# Patient Record
Sex: FEMALE | Race: BLACK OR AFRICAN AMERICAN | NOT HISPANIC OR LATINO | Employment: UNEMPLOYED | URBAN - METROPOLITAN AREA
[De-identification: names, ages, dates, MRNs, and addresses within clinical notes are randomized per-mention and may not be internally consistent; named-entity substitution may affect disease eponyms.]

---

## 2023-09-19 ENCOUNTER — OFFICE VISIT (OUTPATIENT)
Dept: PRIMARY CARE | Facility: CLINIC | Age: 58
End: 2023-09-19
Payer: COMMERCIAL

## 2023-09-19 VITALS
HEIGHT: 61 IN | WEIGHT: 179.5 LBS | BODY MASS INDEX: 33.89 KG/M2 | HEART RATE: 87 BPM | DIASTOLIC BLOOD PRESSURE: 71 MMHG | RESPIRATION RATE: 18 BRPM | OXYGEN SATURATION: 95 % | SYSTOLIC BLOOD PRESSURE: 101 MMHG | TEMPERATURE: 98.2 F

## 2023-09-19 DIAGNOSIS — E55.9 VITAMIN D DEFICIENCY: ICD-10-CM

## 2023-09-19 DIAGNOSIS — Z00.00 HEALTHCARE MAINTENANCE: Primary | ICD-10-CM

## 2023-09-19 DIAGNOSIS — Z12.2 SCREENING FOR LUNG CANCER: ICD-10-CM

## 2023-09-19 DIAGNOSIS — Z72.0 TOBACCO USE: ICD-10-CM

## 2023-09-19 DIAGNOSIS — Z12.31 VISIT FOR SCREENING MAMMOGRAM: ICD-10-CM

## 2023-09-19 PROCEDURE — 3078F DIAST BP <80 MM HG: CPT | Performed by: STUDENT IN AN ORGANIZED HEALTH CARE EDUCATION/TRAINING PROGRAM

## 2023-09-19 PROCEDURE — 3074F SYST BP LT 130 MM HG: CPT | Performed by: STUDENT IN AN ORGANIZED HEALTH CARE EDUCATION/TRAINING PROGRAM

## 2023-09-19 PROCEDURE — 1036F TOBACCO NON-USER: CPT | Performed by: STUDENT IN AN ORGANIZED HEALTH CARE EDUCATION/TRAINING PROGRAM

## 2023-09-19 PROCEDURE — 99396 PREV VISIT EST AGE 40-64: CPT | Performed by: STUDENT IN AN ORGANIZED HEALTH CARE EDUCATION/TRAINING PROGRAM

## 2023-09-19 RX ORDER — IBUPROFEN 800 MG/1
600 TABLET ORAL EVERY 8 HOURS PRN
COMMUNITY
Start: 2023-08-23 | End: 2024-05-15

## 2023-09-19 RX ORDER — MULTIVITAMIN/IRON/FOLIC ACID 18MG-0.4MG
1 TABLET ORAL DAILY
COMMUNITY
Start: 2023-04-25

## 2023-09-19 ASSESSMENT — PAIN SCALES - GENERAL: PAINLEVEL: 8

## 2023-09-19 NOTE — PROGRESS NOTES
"Subjective   Patient ID: Brenna Castaneda is a 58 y.o. female who presents for  visit.     HPI     57 yo F with PMH of chronic back pain, bipolar disorder, alcohol use disorder presenting for routine health maintenance. No concerns at this time.     Shx: 2-3 cigarettes per day; >20 pack year smoking Hx     Review of Systems    ROS:    - CONSTITUTIONAL: Denies weight loss, fever and chills.    - HEENT: Denies changes in vision and hearing.    - RESPIRATORY: Denies SOB and cough.    - CV: Denies palpitations and CP.    - GI: Denies abdominal pain, nausea, vomiting and diarrhea.    - : Denies dysuria and urinary frequency.    - MSK: Denies myalgia and joint pain.    - SKIN: Denies rash and pruritus.    - NEUROLOGICAL: Denies headache and syncope.    - PSYCHIATRIC: Denies recent changes in mood. Denies anxiety and depression.    A 12-point review of systems was completed and is otherwise negative except as noted in the HPI.      Objective   /71 (BP Location: Right arm, Patient Position: Sitting, BP Cuff Size: Adult)   Pulse 87   Temp 36.8 °C (98.2 °F) (Temporal)   Resp 18   Ht 1.549 m (5' 1\")   Wt 81.4 kg (179 lb 8 oz)   SpO2 95%   BMI 33.92 kg/m²     Physical Exam    PHYSICAL EXAM:    - GENERAL: Alert and oriented x 3. No acute distress. Well-nourished.    - EYES: EOMI. Anicteric.    - HENT: Moist mucous membranes. No scleral icterus. No cervical lymphadenopathy.    - LUNGS: Clear to auscultation bilaterally. No accessory muscle use.    - CARDIOVASCULAR: Regular rate and rhythm. No murmur. No JVD.    - ABDOMEN: Soft, non-tender and non-distended. No palpable masses.    - EXTREMITIES: No edema. Non-tender.    - SKIN: No rashes or lesions. Warm.    - NEUROLOGIC: No focal neurological deficits. CN II-XII grossly intact, but not individually tested.    - PSYCHIATRIC: Cooperative. Appropriate mood and affect.     Assessment/Plan        # Routine Health Maintenance  - Flu vaccine: declined   - Pneuomvax: declined "   - Tdap: declined  - Shingrix(50+): declined  - Hep C: utd  - HIV: utd  - Syphilis: utd  - Lipid Panel: ordered   - DM screening: ordered A1c  - HTN screenin/71  - Vitamin D 25-OH: ordered   - Depression: PHQ-2 0  - Tobacco Cessation: see below   - Last Dental: recommended follow up  - Last Eye exam: recommended follow up  - Colonoscopy (50-75): UTD  - Lung CA screening (55-80): >20 years; LDCT ordered   - pap smear(21-65): Hysterectomy  - Breast CA screening: ordered     #Tobacco use (2-3 cigarettes per day)   -Advised smoking cessation  -Patient ready to cut down on her tobacco use.   -Discussed available resources: support groups, behavioral counseling, and pharmacological interventions   -Patient interested in nicotine patches   -Patient is advised to call 1-800-QUIT-NOW for support in quitting, including free quit coaching, a free quit plan, free educational materials, and referrals to local resources.  -Discussed and advised on nicotine withdrawal symptoms (increased appetite and weight gain, changes in mood, insomnia, irritability, anxiety, difficulty concentrating, and restlessness).  -Continue to assess and follow up on upcoming visits    Patient discussed with attending physician Dr. Jimmie Rodriguez MD  Family Medicine, PGY-3    This note was completed using Dragon voice recognition technology and may include unintended errors with respect to translation of words, typographical errors or grammar errors which may not have been identified while finalizing the chart.

## 2023-09-25 PROBLEM — M25.531 ARTHRALGIA OF RIGHT WRIST: Status: ACTIVE | Noted: 2023-09-25

## 2023-09-25 PROBLEM — F14.929: Status: ACTIVE | Noted: 2023-09-25

## 2023-09-25 PROBLEM — R53.83 FATIGUE: Status: ACTIVE | Noted: 2023-09-25

## 2023-09-25 PROBLEM — I10 BENIGN ESSENTIAL HYPERTENSION: Status: ACTIVE | Noted: 2023-09-25

## 2023-09-25 PROBLEM — M54.9 CHRONIC BACK PAIN: Status: ACTIVE | Noted: 2023-09-25

## 2023-09-25 PROBLEM — F17.200 NICOTINE DEPENDENCE: Status: ACTIVE | Noted: 2023-09-25

## 2023-09-25 PROBLEM — F31.9 BIPOLAR 1 DISORDER (MULTI): Status: ACTIVE | Noted: 2023-09-25

## 2023-09-25 PROBLEM — K08.409 S/P TOOTH EXTRACTION: Status: ACTIVE | Noted: 2023-09-25

## 2023-09-25 PROBLEM — M75.01 ADHESIVE CAPSULITIS OF RIGHT SHOULDER: Status: ACTIVE | Noted: 2023-09-25

## 2023-09-25 PROBLEM — M47.817 FACET JOINT DISEASE OF LUMBOSACRAL REGION: Status: ACTIVE | Noted: 2023-09-25

## 2023-09-25 PROBLEM — E66.9 OBESE: Status: ACTIVE | Noted: 2023-09-25

## 2023-09-25 PROBLEM — F32.A DEPRESSION: Status: ACTIVE | Noted: 2023-09-25

## 2023-09-25 PROBLEM — N39.3 STRESS INCONTINENCE, FEMALE: Status: ACTIVE | Noted: 2023-09-25

## 2023-09-25 PROBLEM — H90.3 BILATERAL SENSORINEURAL HEARING LOSS: Status: ACTIVE | Noted: 2023-09-25

## 2023-09-25 PROBLEM — Z59.41 FOOD INSECURITY: Status: ACTIVE | Noted: 2023-09-25

## 2023-09-25 PROBLEM — M25.561 RIGHT KNEE PAIN: Status: ACTIVE | Noted: 2023-09-25

## 2023-09-25 PROBLEM — F14.20: Status: ACTIVE | Noted: 2023-09-25

## 2023-09-25 PROBLEM — M54.32 SCIATICA OF LEFT SIDE: Status: ACTIVE | Noted: 2023-09-25

## 2023-09-25 PROBLEM — J45.20 ASTHMA, MILD INTERMITTENT (HHS-HCC): Status: ACTIVE | Noted: 2023-09-25

## 2023-09-25 PROBLEM — G56.02 CARPAL TUNNEL SYNDROME OF LEFT WRIST: Status: ACTIVE | Noted: 2023-09-25

## 2023-09-25 PROBLEM — K59.00 CONSTIPATION: Status: ACTIVE | Noted: 2023-09-25

## 2023-09-25 PROBLEM — G89.29 CHRONIC BACK PAIN: Status: ACTIVE | Noted: 2023-09-25

## 2023-09-25 RX ORDER — IBUPROFEN 200 MG
1 TABLET ORAL EVERY 24 HOURS
Qty: 30 PATCH | Refills: 0 | Status: SHIPPED | OUTPATIENT
Start: 2023-09-25 | End: 2023-09-25

## 2023-09-25 NOTE — PROGRESS NOTES
I reviewed with the resident the medical history and the resident’s findings on physical examination.  I discussed with the resident the patient’s diagnosis and concur with the treatment plan as documented in the resident note.     Barbara Samuel MD

## 2023-10-01 ENCOUNTER — PHARMACY VISIT (OUTPATIENT)
Dept: PHARMACY | Facility: CLINIC | Age: 58
End: 2023-10-01
Payer: MEDICAID

## 2024-01-30 ENCOUNTER — APPOINTMENT (OUTPATIENT)
Dept: ORTHOPEDIC SURGERY | Facility: HOSPITAL | Age: 59
End: 2024-01-30
Payer: COMMERCIAL

## 2024-02-02 ENCOUNTER — APPOINTMENT (OUTPATIENT)
Dept: ORTHOPEDIC SURGERY | Facility: HOSPITAL | Age: 59
End: 2024-02-02
Payer: COMMERCIAL

## 2024-05-15 ENCOUNTER — APPOINTMENT (OUTPATIENT)
Dept: RADIOLOGY | Facility: HOSPITAL | Age: 59
End: 2024-05-15
Payer: COMMERCIAL

## 2024-05-15 ENCOUNTER — PHARMACY VISIT (OUTPATIENT)
Dept: PHARMACY | Facility: CLINIC | Age: 59
End: 2024-05-15
Payer: MEDICAID

## 2024-05-15 ENCOUNTER — HOSPITAL ENCOUNTER (EMERGENCY)
Facility: HOSPITAL | Age: 59
Discharge: HOME | End: 2024-05-15
Payer: COMMERCIAL

## 2024-05-15 VITALS
BODY MASS INDEX: 31.15 KG/M2 | HEIGHT: 61 IN | TEMPERATURE: 96.8 F | HEART RATE: 74 BPM | SYSTOLIC BLOOD PRESSURE: 116 MMHG | WEIGHT: 165 LBS | OXYGEN SATURATION: 96 % | RESPIRATION RATE: 18 BRPM | DIASTOLIC BLOOD PRESSURE: 78 MMHG

## 2024-05-15 DIAGNOSIS — J02.9 ACUTE PHARYNGITIS, UNSPECIFIED ETIOLOGY: Primary | ICD-10-CM

## 2024-05-15 LAB — S PYO DNA THROAT QL NAA+PROBE: NOT DETECTED

## 2024-05-15 PROCEDURE — 73030 X-RAY EXAM OF SHOULDER: CPT | Mod: RT

## 2024-05-15 PROCEDURE — 87651 STREP A DNA AMP PROBE: CPT

## 2024-05-15 PROCEDURE — 99283 EMERGENCY DEPT VISIT LOW MDM: CPT

## 2024-05-15 PROCEDURE — 2500000001 HC RX 250 WO HCPCS SELF ADMINISTERED DRUGS (ALT 637 FOR MEDICARE OP): Mod: SE

## 2024-05-15 PROCEDURE — 2500000004 HC RX 250 GENERAL PHARMACY W/ HCPCS (ALT 636 FOR OP/ED): Mod: SE

## 2024-05-15 PROCEDURE — 73030 X-RAY EXAM OF SHOULDER: CPT | Mod: RIGHT SIDE | Performed by: RADIOLOGY

## 2024-05-15 PROCEDURE — 99284 EMERGENCY DEPT VISIT MOD MDM: CPT

## 2024-05-15 PROCEDURE — RXMED WILLOW AMBULATORY MEDICATION CHARGE

## 2024-05-15 PROCEDURE — 2500000006 HC RX 250 W HCPCS SELF ADMINISTERED DRUGS (ALT 637 FOR ALL PAYERS): Mod: SE

## 2024-05-15 RX ORDER — IBUPROFEN 400 MG/1
400 TABLET ORAL ONCE
Status: COMPLETED | OUTPATIENT
Start: 2024-05-15 | End: 2024-05-15

## 2024-05-15 RX ORDER — PENICILLIN V POTASSIUM 500 MG/1
500 TABLET, FILM COATED ORAL 2 TIMES DAILY
Qty: 20 TABLET | Refills: 0 | Status: SHIPPED | OUTPATIENT
Start: 2024-05-15 | End: 2024-05-25

## 2024-05-15 RX ORDER — IBUPROFEN 600 MG/1
600 TABLET ORAL EVERY 6 HOURS PRN
Qty: 16 TABLET | Refills: 0 | Status: SHIPPED | OUTPATIENT
Start: 2024-05-15 | End: 2024-05-19

## 2024-05-15 RX ORDER — PENICILLIN V POTASSIUM 250 MG/1
500 TABLET, FILM COATED ORAL ONCE
Status: COMPLETED | OUTPATIENT
Start: 2024-05-15 | End: 2024-05-15

## 2024-05-15 RX ORDER — ACETAMINOPHEN 325 MG/1
975 TABLET ORAL ONCE
Status: COMPLETED | OUTPATIENT
Start: 2024-05-15 | End: 2024-05-15

## 2024-05-15 RX ORDER — PREDNISONE 20 MG/1
40 TABLET ORAL DAILY
Qty: 6 TABLET | Refills: 0 | Status: SHIPPED | OUTPATIENT
Start: 2024-05-15 | End: 2024-05-18

## 2024-05-15 RX ORDER — ACETAMINOPHEN 325 MG/1
650 TABLET ORAL EVERY 6 HOURS PRN
Qty: 20 TABLET | Refills: 0 | Status: SHIPPED | OUTPATIENT
Start: 2024-05-15 | End: 2024-05-20

## 2024-05-15 RX ADMIN — IBUPROFEN 400 MG: 400 TABLET, FILM COATED ORAL at 09:15

## 2024-05-15 RX ADMIN — DEXAMETHASONE 10 MG: 6 TABLET ORAL at 09:35

## 2024-05-15 RX ADMIN — ACETAMINOPHEN 975 MG: 325 TABLET ORAL at 09:34

## 2024-05-15 RX ADMIN — PENICILLIN V POTASSIUM 500 MG: 250 TABLET, FILM COATED ORAL at 09:36

## 2024-05-15 ASSESSMENT — COLUMBIA-SUICIDE SEVERITY RATING SCALE - C-SSRS
2. HAVE YOU ACTUALLY HAD ANY THOUGHTS OF KILLING YOURSELF?: NO
1. IN THE PAST MONTH, HAVE YOU WISHED YOU WERE DEAD OR WISHED YOU COULD GO TO SLEEP AND NOT WAKE UP?: NO
6. HAVE YOU EVER DONE ANYTHING, STARTED TO DO ANYTHING, OR PREPARED TO DO ANYTHING TO END YOUR LIFE?: NO

## 2024-05-15 NOTE — ED PROVIDER NOTES
HPI   Chief Complaint   Patient presents with    Sore Throat       59-year-old female with history of asthma, bipolar, substance use disorder with cocaine, presents for chief complaint of sore throat with odynophagia for 2 days.  Denies injury.  Endorses mild cough but mostly due to the thick sputum associated with a sore throat she says.  Also endorses mild frontal headache.  Endorses occasional chills and myalgia.  No nausea or vomiting.  No chest pain or dyspnea.  In addition she states that she excellently rolled out of bed yesterday and landed on her right shoulder, causing her pain.  Denies hitting head or losing consciousness.  No anticoagulation use.  Denies any neck or back pain.                          No data recorded                   Patient History   Past Medical History:   Diagnosis Date    Acute bronchitis due to other specified organisms 05/25/2017    Acute bacterial bronchitis    Cutaneous abscess of face 05/25/2017    Abscess of face    Elevated blood-pressure reading, without diagnosis of hypertension 04/03/2017    Elevated blood pressure reading    Hypermetropia, bilateral 05/20/2016    Hyperopia with presbyopia of both eyes    Jaw pain 05/25/2017    Jaw pain    Menopausal and female climacteric states 09/14/2021    Menopausal symptoms    Pain in left knee 03/30/2018    Acute pain of left knee    Personal history of other diseases of the musculoskeletal system and connective tissue 05/17/2016    History of low back pain    Personal history of other diseases of the nervous system and sense organs 09/13/2019    History of acute otitis externa     Past Surgical History:   Procedure Laterality Date    HYSTERECTOMY  05/17/2016    Hysterectomy     No family history on file.  Social History     Tobacco Use    Smoking status: Never    Smokeless tobacco: Never   Substance Use Topics    Alcohol use: Never    Drug use: Never       Physical Exam   ED Triage Vitals [05/15/24 0833]   Temperature Heart Rate  Respirations BP   36 °C (96.8 °F) 74 18 116/78      Pulse Ox Temp src Heart Rate Source Patient Position   96 % -- Monitor Sitting      BP Location FiO2 (%)     Right arm --       Physical Exam  Constitutional:       Appearance: Normal appearance.   HENT:      Head: Normocephalic and atraumatic.      Mouth/Throat:      Mouth: Mucous membranes are moist.      Pharynx: Oropharynx is clear.      Comments: Midline uvula.  Tonsils with exudate and mild swelling with erythema.  Normal phonation.  No sublingual crepitus.  Eyes:      Extraocular Movements: Extraocular movements intact.      Conjunctiva/sclera: Conjunctivae normal.      Pupils: Pupils are equal, round, and reactive to light.   Cardiovascular:      Rate and Rhythm: Normal rate and regular rhythm.      Pulses: Normal pulses.      Heart sounds: Normal heart sounds.   Pulmonary:      Effort: Pulmonary effort is normal.      Breath sounds: Normal breath sounds.   Abdominal:      General: Abdomen is flat.      Palpations: Abdomen is soft.   Musculoskeletal:         General: Normal range of motion.      Cervical back: Normal range of motion and neck supple.   Skin:     General: Skin is warm and dry.      Capillary Refill: Capillary refill takes less than 2 seconds.   Neurological:      General: No focal deficit present.      Mental Status: She is alert and oriented to person, place, and time.   Psychiatric:         Mood and Affect: Mood normal.         Behavior: Behavior normal.         Thought Content: Thought content normal.         Judgment: Judgment normal.         ED Course & MDM   Diagnoses as of 05/21/24 0604   Acute pharyngitis, unspecified etiology       Medical Decision Making  Vital signs reviewed, unremarkable at this time.  Patient is well-appearing and in no apparent distress.  Speaks full sentences without difficulty.  Diagnostic testing performed.  Exam concerning for strep pharyngitis.  Will be treated with penicillin VK per patient's choice,  instead of IM penicillin which was offered and declined.  Dexamethasone given for swelling.  Ibuprofen and Tylenol given for pain control.  Group A strep swab performed with PCR.  X-ray of the shoulder pending. X-ray of the right shoulder showed no acute findings.  Group A strep swab was still pending and patient needed to leave.  With patient's exam findings concerning for strep pharyngitis, she was treated with penicillin.  Also given Rx for prednisone Tylenol, ibuprofen.  Advised to take as directed and to follow-up with primary care.  Encouraged to return with any new or worsening symptoms.  Patient in agreement with this plan.  Discharged in stable condition.        Procedure  Procedures     Alfredo Yanez, OMI-CNP  05/21/24 0606

## 2024-10-14 ENCOUNTER — HOSPITAL ENCOUNTER (EMERGENCY)
Facility: HOSPITAL | Age: 59
Discharge: LEFT BEFORE DISCHARGE | End: 2024-10-14
Payer: COMMERCIAL

## 2024-10-14 ENCOUNTER — CLINICAL SUPPORT (OUTPATIENT)
Dept: EMERGENCY MEDICINE | Facility: HOSPITAL | Age: 59
End: 2024-10-14
Payer: COMMERCIAL

## 2024-10-14 VITALS
HEIGHT: 61 IN | DIASTOLIC BLOOD PRESSURE: 68 MMHG | OXYGEN SATURATION: 96 % | RESPIRATION RATE: 16 BRPM | HEART RATE: 93 BPM | BODY MASS INDEX: 29.83 KG/M2 | TEMPERATURE: 97.5 F | SYSTOLIC BLOOD PRESSURE: 115 MMHG | WEIGHT: 158 LBS

## 2024-10-14 DIAGNOSIS — R42 DIZZINESS: ICD-10-CM

## 2024-10-14 DIAGNOSIS — R22.31 LOCALIZED SWELLING OF RIGHT UPPER EXTREMITY: Primary | ICD-10-CM

## 2024-10-14 PROCEDURE — 99284 EMERGENCY DEPT VISIT MOD MDM: CPT

## 2024-10-14 PROCEDURE — 2500000001 HC RX 250 WO HCPCS SELF ADMINISTERED DRUGS (ALT 637 FOR MEDICARE OP): Mod: SE

## 2024-10-14 PROCEDURE — 93005 ELECTROCARDIOGRAM TRACING: CPT

## 2024-10-14 PROCEDURE — 99283 EMERGENCY DEPT VISIT LOW MDM: CPT

## 2024-10-14 PROCEDURE — 2500000005 HC RX 250 GENERAL PHARMACY W/O HCPCS: Mod: SE

## 2024-10-14 RX ORDER — ONDANSETRON 4 MG/1
4 TABLET, ORALLY DISINTEGRATING ORAL ONCE
Status: COMPLETED | OUTPATIENT
Start: 2024-10-14 | End: 2024-10-14

## 2024-10-14 RX ORDER — ACETAMINOPHEN 325 MG/1
650 TABLET ORAL ONCE
Status: COMPLETED | OUTPATIENT
Start: 2024-10-14 | End: 2024-10-14

## 2024-10-14 RX ADMIN — ONDANSETRON 4 MG: 4 TABLET, ORALLY DISINTEGRATING ORAL at 17:40

## 2024-10-14 RX ADMIN — ACETAMINOPHEN 650 MG: 325 TABLET ORAL at 17:40

## 2024-10-14 ASSESSMENT — COLUMBIA-SUICIDE SEVERITY RATING SCALE - C-SSRS
6. HAVE YOU EVER DONE ANYTHING, STARTED TO DO ANYTHING, OR PREPARED TO DO ANYTHING TO END YOUR LIFE?: NO
2. HAVE YOU ACTUALLY HAD ANY THOUGHTS OF KILLING YOURSELF?: NO
1. IN THE PAST MONTH, HAVE YOU WISHED YOU WERE DEAD OR WISHED YOU COULD GO TO SLEEP AND NOT WAKE UP?: NO

## 2024-10-14 ASSESSMENT — PAIN - FUNCTIONAL ASSESSMENT: PAIN_FUNCTIONAL_ASSESSMENT: 0-10

## 2024-10-14 ASSESSMENT — PAIN DESCRIPTION - LOCATION: LOCATION: ARM

## 2024-10-14 ASSESSMENT — PAIN SCALES - GENERAL
PAINLEVEL_OUTOF10: 0 - NO PAIN
PAINLEVEL_OUTOF10: 9

## 2024-10-14 NOTE — ED PROVIDER NOTES
HPI   Chief Complaint   Patient presents with    Dizziness       HPI    Brenna Castaneda is a 59-year-old female with history of hypertension presented the ED with right arm pain after donating plasma today.  Patient states she donated plasma around 2 hours ago and the phlebotomist put the needle too far into her right arm.  Patient states she has had right arm pain, numbness, and tingling since the needle was inserted.  Patient states the right arm pain radiates to her right neck.  Patient states she made the phlebotomist take the needle out immediately and did not continue donating plasma.  Patient states she also began feeling lightheaded after this occurred.  Patient states she went home and grab something to eat before she came to the ED thinking eating would help with the lightheadedness.  Patient states she currently feels lightheaded but denies loss of consciousness.  Patient states she is unable to bend her left elbow due to the pain. Patient states she fells nauseous but denies vomiting.  Patient denies use of blood thinners. Patient denies chest pain, shortness of breath, abdominal pain, fevers.      Patient History   Past Medical History:   Diagnosis Date    Acute bronchitis due to other specified organisms 05/25/2017    Acute bacterial bronchitis    Cutaneous abscess of face 05/25/2017    Abscess of face    Elevated blood-pressure reading, without diagnosis of hypertension 04/03/2017    Elevated blood pressure reading    Hypermetropia, bilateral 05/20/2016    Hyperopia with presbyopia of both eyes    Jaw pain 05/25/2017    Jaw pain    Menopausal and female climacteric states 09/14/2021    Menopausal symptoms    Pain in left knee 03/30/2018    Acute pain of left knee    Personal history of other diseases of the musculoskeletal system and connective tissue 05/17/2016    History of low back pain    Personal history of other diseases of the nervous system and sense organs 09/13/2019    History of acute otitis  externa     Past Surgical History:   Procedure Laterality Date    HYSTERECTOMY  05/17/2016    Hysterectomy     No family history on file.  Social History     Tobacco Use    Smoking status: Never    Smokeless tobacco: Never   Substance Use Topics    Alcohol use: Never    Drug use: Never       Physical Exam   ED Triage Vitals [10/14/24 1553]   Temperature Heart Rate Respirations BP   36.4 °C (97.5 °F) 93 16 115/68      Pulse Ox Temp src Heart Rate Source Patient Position   96 % -- -- --      BP Location FiO2 (%)     -- --       Physical Exam  Vitals and nursing note reviewed.   Constitutional:       Appearance: Normal appearance.   HENT:      Mouth/Throat:      Mouth: Mucous membranes are moist.      Pharynx: Oropharynx is clear. No oropharyngeal exudate or posterior oropharyngeal erythema.   Eyes:      Extraocular Movements: Extraocular movements intact.      Conjunctiva/sclera: Conjunctivae normal.      Pupils: Pupils are equal, round, and reactive to light.   Cardiovascular:      Rate and Rhythm: Normal rate and regular rhythm.      Heart sounds: Normal heart sounds. No murmur heard.     No gallop.   Pulmonary:      Effort: Pulmonary effort is normal. No respiratory distress.      Breath sounds: Normal breath sounds. No stridor. No wheezing, rhonchi or rales.   Abdominal:      General: Abdomen is flat. Bowel sounds are normal. There is no distension.      Palpations: Abdomen is soft. There is no mass.      Tenderness: There is no abdominal tenderness. There is no guarding or rebound.      Hernia: No hernia is present.   Musculoskeletal:      Right upper arm: Swelling (Anterior distal upper arm superior to the antecubital fossa) and tenderness (Anterior distal upper arm Superior to the antecubital fossa) present. No bony tenderness.      Left upper arm: Normal.      Right elbow: No swelling or effusion. Normal range of motion. No tenderness.      Left elbow: Normal.      Left forearm: Normal.      Right wrist:  Normal pulse.      Left wrist: Normal. Normal pulse.      Right hand: Normal.      Left hand: Normal.      Right lower leg: No edema.      Left lower leg: No edema.      Comments: No warmth or erythema overlying the Areas of swelling.   Skin:     General: Skin is warm and dry.   Neurological:      General: No focal deficit present.      Mental Status: She is alert and oriented to person, place, and time.      Cranial Nerves: Cranial nerves 2-12 are intact. No dysarthria or facial asymmetry.      Sensory: Sensation is intact.      Motor: Motor function is intact. No tremor or pronator drift.      Coordination: Coordination is intact. Coordination normal. Finger-Nose-Finger Test and Heel to Shin Test normal. Rapid alternating movements normal.      Gait: Gait is intact.   Psychiatric:         Mood and Affect: Mood normal.         Behavior: Behavior normal.           ED Course & MDM   Diagnoses as of 10/15/24 0112   Dizziness   Localized swelling of right upper extremity                 No data recorded     [place] Coma Scale Score: 15 (10/14/24 1549 : Faiza Cox RN)                           Medical Decision Making  This is a 59-year-old female presenting the ED with right arm pain after donating plasma today.  Patient states she donated plasma around 2 hours ago and the phlebotomist put the needle too far into her right arm.  Patient states she has had right arm pain, numbness, and tingling since the needle was inserted patient states the right arm pain radiates to the right neck.  Patient states she also began feeling lightheaded after this occurred and currently feels lightheaded.  Patient denies loss conscious.  Neuroexam is unremarkable and patient denies use of blood thinners.  Low suspicion for intracranial hemorrhage, hematoma, mass therefore CT head was not obtained today.  On exam the patient has tenderness to the right neck but no bony tenderness or decreased range of motion.  Low suspicion for cervical  spine dislocation fracture therefore CT cervical spine was not obtained today.  On exam the patient does have swelling and tenderness to the right anterior distal forearm superior to the antecubital fossa.  No bruising or or warmth or erythema noted.  Low suspicion for cellulitis or other infection at this time.  CBC was obtained today to rule out anemia causing the patient's lightheadedness.  CMP was obtained to rule out electrolyte abnormalities.   Patient refused labs to be drawn.  Discussed with patient the reasoning for obtaining the labs however patient still refused to have her blood drawn.  Patient was provided with water to improve lightheadedness.  Patient was given Zofran for nausea and Tylenol for pain.  Shortly after initial exam the patient stated she wanted to leave the emergency department and did not want to wait for further treatment and workup.  Patient has remained hemodynamically stable in the ED today without syncopal episodes.    Disposition: Leaving AGAINST MEDICAL ADVICE  Patient stated she wanted to leave the emergency department and did not want to wait for additional workup.  Discussed with patient that further evaluation needs to be done in order to determine what is causing her symptoms.  However patient stated she did not want to wait.  Discussed with patient if she leaves the emergency department she is at risk of developing infection that could potentially lead to loss of the right arm, permanent loss of sensation and motor function to the right arm, complications with the neck close a decreased range of motion to the neck, or other life-threatening complications that could lead to death.  Expressed her understanding of the risks of leaving and states she wants to leave still.  Patient is A&O x 4 and does have the capacity to leave the ED AMA.  Patient signed AMA form prior to leaving the ED.    Procedure  Procedures     Jong Malik PA-C  10/15/24 0113

## 2024-10-14 NOTE — ED TRIAGE NOTES
Pt presents after donating plasma. States when she got home she felt dizzy and nauseated so she ate and then came to ED.

## 2024-10-15 LAB
ATRIAL RATE: 86 BPM
P AXIS: 61 DEGREES
P OFFSET: 215 MS
P ONSET: 158 MS
PR INTERVAL: 130 MS
Q ONSET: 223 MS
QRS COUNT: 14 BEATS
QRS DURATION: 86 MS
QT INTERVAL: 372 MS
QTC CALCULATION(BAZETT): 445 MS
QTC FREDERICIA: 419 MS
R AXIS: 14 DEGREES
T AXIS: 43 DEGREES
T OFFSET: 409 MS
VENTRICULAR RATE: 86 BPM

## 2024-10-16 ENCOUNTER — HOSPITAL ENCOUNTER (EMERGENCY)
Facility: HOSPITAL | Age: 59
Discharge: HOME | End: 2024-10-16
Attending: EMERGENCY MEDICINE
Payer: COMMERCIAL

## 2024-10-16 VITALS
RESPIRATION RATE: 16 BRPM | SYSTOLIC BLOOD PRESSURE: 125 MMHG | BODY MASS INDEX: 33.99 KG/M2 | HEIGHT: 61 IN | OXYGEN SATURATION: 100 % | WEIGHT: 180 LBS | DIASTOLIC BLOOD PRESSURE: 80 MMHG | HEART RATE: 63 BPM | TEMPERATURE: 98.1 F

## 2024-10-16 DIAGNOSIS — M79.601 RIGHT ARM PAIN: Primary | ICD-10-CM

## 2024-10-16 PROCEDURE — 99282 EMERGENCY DEPT VISIT SF MDM: CPT

## 2024-10-16 PROCEDURE — 99284 EMERGENCY DEPT VISIT MOD MDM: CPT

## 2024-10-16 RX ORDER — NAPROXEN 500 MG/1
500 TABLET ORAL 2 TIMES DAILY PRN
Qty: 30 TABLET | Refills: 0 | Status: SHIPPED | OUTPATIENT
Start: 2024-10-16

## 2024-10-16 ASSESSMENT — PAIN - FUNCTIONAL ASSESSMENT: PAIN_FUNCTIONAL_ASSESSMENT: 0-10

## 2024-10-16 ASSESSMENT — PAIN SCALES - GENERAL: PAINLEVEL_OUTOF10: 9

## 2024-10-16 NOTE — DISCHARGE INSTRUCTIONS
Trial naproxen for pain in your arm. Follow up with primary care if symptoms persist. If you do not have a primary care doctor, can call [phone] to make appointment. Can put ice and heat packs on arm for pain

## 2024-10-16 NOTE — ED PROVIDER NOTES
HPI   Chief Complaint   Patient presents with    Arm Injury       Patient is a 59-year-old female with past medical history of HTN presenting today for right arm pain.  Reports 2 days ago donating plasma.  She believes that the phlebotomist put the IV in her right arm at the AC to deep.  She reports that she immediately had them take the needle out and it started bruising and swelling.  Since then, the swelling is gone down but has had intermittent tingling sensation in her right upper arm.  Also reports numbness after she fell asleep but the numbness went away when she woke up.  She is worried that the vein in her arm is no longer usable and they harmed her in someway by placing the IV into deep.  She believes that they put the IV in 3 to 4 inches deep into her vein.  She also reports weakness in her right arm since then.    Patient presented to the ED same day on 10/14.  They gave her Tylenol for pain but then she refused any labs to be drawn.  Of note, she also told them that she was experiencing lightheadedness which is why they maritza the labs.  She eventually left AMA but presenting again today for continued symptoms in her arm.              Patient History   Past Medical History:   Diagnosis Date    Acute bronchitis due to other specified organisms 05/25/2017    Acute bacterial bronchitis    Cutaneous abscess of face 05/25/2017    Abscess of face    Elevated blood-pressure reading, without diagnosis of hypertension 04/03/2017    Elevated blood pressure reading    Hypermetropia, bilateral 05/20/2016    Hyperopia with presbyopia of both eyes    Jaw pain 05/25/2017    Jaw pain    Menopausal and female climacteric states 09/14/2021    Menopausal symptoms    Pain in left knee 03/30/2018    Acute pain of left knee    Personal history of other diseases of the musculoskeletal system and connective tissue 05/17/2016    History of low back pain    Personal history of other diseases of the nervous system and sense organs  09/13/2019    History of acute otitis externa     Past Surgical History:   Procedure Laterality Date    HYSTERECTOMY  05/17/2016    Hysterectomy     No family history on file.  Social History     Tobacco Use    Smoking status: Never    Smokeless tobacco: Never   Substance Use Topics    Alcohol use: Never    Drug use: Never       Physical Exam   ED Triage Vitals [10/16/24 1338]   Temperature Heart Rate Respirations BP   36.7 °C (98.1 °F) 63 16 125/80      Pulse Ox Temp Source Heart Rate Source Patient Position   100 % Oral Monitor Sitting      BP Location FiO2 (%)     Left arm --       Physical Exam  Vitals and nursing note reviewed.   Constitutional:       General: She is not in acute distress.     Appearance: Normal appearance. She is not ill-appearing.   HENT:      Head: Normocephalic and atraumatic.      Right Ear: External ear normal.      Left Ear: External ear normal.      Nose: Nose normal. No congestion or rhinorrhea.      Mouth/Throat:      Mouth: Mucous membranes are moist.      Pharynx: Oropharynx is clear. No oropharyngeal exudate or posterior oropharyngeal erythema.   Eyes:      Extraocular Movements: Extraocular movements intact.      Conjunctiva/sclera: Conjunctivae normal.      Pupils: Pupils are equal, round, and reactive to light.   Cardiovascular:      Rate and Rhythm: Normal rate and regular rhythm.      Heart sounds: Normal heart sounds.   Pulmonary:      Effort: No accessory muscle usage or respiratory distress.      Breath sounds: Normal breath sounds. No wheezing, rhonchi or rales.   Abdominal:      General: Abdomen is flat. Bowel sounds are normal. There is no distension.      Palpations: Abdomen is soft.      Tenderness: There is no abdominal tenderness. There is no right CVA tenderness or left CVA tenderness.   Musculoskeletal:         General: No swelling or deformity. Normal range of motion.      Cervical back: Normal range of motion and neck supple.      Right lower leg: No edema.       Left lower leg: No edema.   Skin:     General: Skin is warm and dry.      Capillary Refill: Capillary refill takes less than 2 seconds.      Comments: Bruising noted in right AC without any swelling, erythema or signs of cellulitis.  Full strength and sensation in upper and lower extremities.  Mildly tender to palpation along the right AC vein is palpable.   Neurological:      General: No focal deficit present.      Mental Status: She is alert and oriented to person, place, and time.      GCS: GCS eye subscore is 4. GCS verbal subscore is 5. GCS motor subscore is 6.      Cranial Nerves: Cranial nerves 2-12 are intact.      Sensory: No sensory deficit.      Motor: Motor function is intact. No weakness.   Psychiatric:         Mood and Affect: Mood and affect normal.         Speech: Speech normal.         Behavior: Behavior normal. Behavior is cooperative.           ED Course & MDM   Diagnoses as of 10/16/24 1533   Right arm pain                 No data recorded     [place] Coma Scale Score: 15 (10/16/24 1341 : Samm Farfan RN)                           Medical Decision Making  59-year-old female presenting today for right upper extremity pain.  On my exam, mild bruising noted in the AC.  She has full strength and sensation in her arm.  Pain is still palpable in her arm so I did reassure her that her vein is likely still usable after the bruising goes away.  She did tell me that she had her arm fall asleep when she went to bed but sensation came back when she woke up.  I have lower suspicion for CVA or any acute injuries at this time.  Likely a blown vein with placement from IV.  Discussed NSAID use, ice packs and heat packs for symptomatic control and follow-up with primary care        Procedure  Procedures     Jeannette Briscoe PA-C  10/16/24 7125

## 2024-10-16 NOTE — ED TRIAGE NOTES
"Pt presents to ED after being seen two days ago for a bruise S/P attempted plasma donation, where the pt left AMA due to not getting anything stronger than tylenol. Pt states she feels as if she was not taken seriously and that the providers were not listening to her. Pt states that this injury is making her feel \"loopy\" and causing other injuries - knot in her R knee, instability, and inability to write. Pt states that she has a hx of HTN and some bipolar. PT isAOx3.   "

## 2024-10-16 NOTE — Clinical Note
Brenna Castaneda was seen and treated in our emergency department on 10/16/2024.  She may return to work on 10/17/2024.       If you have any questions or concerns, please don't hesitate to call.      Elian Sneed MD

## 2024-12-06 ENCOUNTER — APPOINTMENT (OUTPATIENT)
Dept: PRIMARY CARE | Facility: CLINIC | Age: 59
End: 2024-12-06
Payer: COMMERCIAL

## 2025-10-28 ENCOUNTER — APPOINTMENT (OUTPATIENT)
Dept: OPHTHALMOLOGY | Facility: CLINIC | Age: 60
End: 2025-10-28
Payer: COMMERCIAL

## 2025-11-17 ENCOUNTER — APPOINTMENT (OUTPATIENT)
Dept: OPHTHALMOLOGY | Facility: CLINIC | Age: 60
End: 2025-11-17
Payer: COMMERCIAL